# Patient Record
Sex: FEMALE | Race: WHITE | Employment: PART TIME | URBAN - METROPOLITAN AREA
[De-identification: names, ages, dates, MRNs, and addresses within clinical notes are randomized per-mention and may not be internally consistent; named-entity substitution may affect disease eponyms.]

---

## 2019-04-15 ENCOUNTER — HOSPITAL ENCOUNTER (EMERGENCY)
Age: 29
Discharge: HOME | End: 2019-04-15
Payer: COMMERCIAL

## 2019-04-15 VITALS
OXYGEN SATURATION: 99 % | HEIGHT: 65 IN | BODY MASS INDEX: 30.82 KG/M2 | DIASTOLIC BLOOD PRESSURE: 85 MMHG | SYSTOLIC BLOOD PRESSURE: 135 MMHG | RESPIRATION RATE: 14 BRPM | HEART RATE: 86 BPM | WEIGHT: 185 LBS | TEMPERATURE: 97.8 F

## 2019-04-15 DIAGNOSIS — B00.2 ORAL HERPES SIMPLEX INFECTION: Primary | ICD-10-CM

## 2019-04-15 PROCEDURE — 99282 EMERGENCY DEPT VISIT SF MDM: CPT

## 2019-04-15 RX ORDER — ACYCLOVIR 800 MG/1
800 TABLET ORAL 4 TIMES DAILY
Qty: 40 TABLET | Refills: 0 | Status: SHIPPED | OUTPATIENT
Start: 2019-04-15 | End: 2019-04-25

## 2019-04-15 ASSESSMENT — PAIN DESCRIPTION - DESCRIPTORS: DESCRIPTORS: SORE

## 2019-04-15 ASSESSMENT — PAIN DESCRIPTION - ORIENTATION: ORIENTATION: UPPER

## 2019-04-15 NOTE — ED PROVIDER NOTES
[facility]       Department of Emergency Medicine   ED  Provider Note  Admit Date/RoomTime: 4/15/2019  7:39 AM  ED Room: [place] [address]  MRN: [id number]  Chief Complaint: Wound Check (SORE ON UPPER LIP SINCE SAT)       History of Present Illness   Source of history provided by:  patient. History/Exam Limitations: none. Amelia Carpenter is a 34 y.o. female who has a past medical history of: History reviewed. No pertinent past medical history. presents to the ED by private car and is alone for sore to the right upper lip. Patient states this started two days ago. She states that it blistered that day as well. She denies any drainage. She states that she has had this multiple times in the past, states that it has become more swollen each day since then. She reports mild pain in the area of the blister. No other complaints today. ROS    Pertinent positives and negatives are stated within HPI, all other systems reviewed and are negative. History reviewed. No pertinent surgical history. Social History:  reports that she has never smoked. She does not have any smokeless tobacco history on file. Family History: family history is not on file. Allergies: Patient has no known allergies. Physical Exam   Oxygen Saturation Interpretation: Normal.   ED Triage Vitals [04/15/19 0736]   BP Temp Temp Source Pulse Resp SpO2 Height Weight   135/85 97.8 °F (36.6 °C) Oral 86 14 99 % 5' 5\" (1.651 m) 185 lb (83.9 kg)       Physical Exam  · Constitutional/General: Alert and oriented x3, well appearing, non toxic  · HEENT:  NC/NT. PERRLA,  Airway patent. There is area of right upper lip w/blister and mild edema. There is no erythema, no sign of cellulitis, no sign of abscess formation at this time. C/w herpes simplex virus. No oral lesions or posterior pharyngeal lesions.    · Neck: Supple, full ROM, non tender to palpation in the midline, no stridor, no crepitus, no meningeal signs  · Respiratory: Lungs clear to auscultation bilaterally, no wheezes, rales, or rhonchi. Not in respiratory distress  · CV:  Regular rate. Regular rhythm. No murmurs, gallops, or rubs. 2+ distal pulses  · Chest: No chest wall tenderness  · GI:  Abdomen Soft, Non tender, Non distended. +BS. No rebound, guarding, or rigidity. No pulsatile masses. · Musculoskeletal: Moves all extremities x 4. Warm and well perfused, no clubbing, cyanosis, or edema. Capillary refill <3 seconds  · Integument: skin warm and dry. No rashes. · Lymphatic: no lymphadenopathy noted  · Neurologic: GCS 15, no focal deficits, symmetric strength 5/5 in the upper and lower extremities bilaterally  · Psychiatric: Normal Affect    Lab / Imaging Results   (All laboratory and radiology results have been personally reviewed by myself)  Labs:  No results found for this visit on 04/15/19. Imaging: All Radiology results interpreted by Radiologist unless otherwise noted. No orders to display       ED Course / Medical Decision Making   Medications - No data to display         Consult(s):   None    Procedure(s):   none    MDM:   Patient likely w/herpes simplex virus of the lip. No other lesions. No sign of cellulitis or abscess formation. Patient d/c home on Acyclovir. Advised to return to the ER if worse in any way. Otherwise to f/uw /PCP. Counseling: The emergency provider has spoken with the patient and discussed todays results, in addition to providing specific details for the plan of care and counseling regarding the diagnosis and prognosis. Questions are answered at this time and they are agreeable with the plan. Assessment      1.  Oral herpes simplex infection      Plan   Discharge to home  Patient condition is good    New Medications     New Prescriptions    ACYCLOVIR (ZOVIRAX) 800 MG TABLET    Take 1 tablet by mouth 4 times daily for 10 days     Electronically signed by GRIFFIN Hernandez   DD: 4/15/19  **This report was transcribed using voice recognition software. Every effort was made to ensure accuracy; however, inadvertent computerized transcription errors may be present.   END OF ED PROVIDER NOTE       Woodrow Jaimes  04/15/19 5757